# Patient Record
Sex: FEMALE
[De-identification: names, ages, dates, MRNs, and addresses within clinical notes are randomized per-mention and may not be internally consistent; named-entity substitution may affect disease eponyms.]

---

## 2018-08-24 PROBLEM — Z00.00 ENCOUNTER FOR PREVENTIVE HEALTH EXAMINATION: Status: ACTIVE | Noted: 2018-08-24

## 2018-10-03 PROBLEM — E04.1 SOLITARY THYROID NODULE: Status: RESOLVED | Noted: 2018-10-03 | Resolved: 2018-10-03

## 2018-10-03 PROBLEM — Z78.9 CAFFEINE USE: Status: ACTIVE | Noted: 2018-10-03

## 2018-10-03 PROBLEM — Z87.898 HISTORY OF BREAST LUMP: Status: RESOLVED | Noted: 2018-10-03 | Resolved: 2018-10-03

## 2018-10-03 PROBLEM — Z87.39 HISTORY OF MYOPATHY: Status: RESOLVED | Noted: 2018-10-03 | Resolved: 2018-10-03

## 2018-10-08 ENCOUNTER — NON-APPOINTMENT (OUTPATIENT)
Age: 43
End: 2018-10-08

## 2018-10-08 ENCOUNTER — APPOINTMENT (OUTPATIENT)
Dept: CARDIOLOGY | Facility: CLINIC | Age: 43
End: 2018-10-08
Payer: COMMERCIAL

## 2018-10-08 VITALS
HEART RATE: 66 BPM | SYSTOLIC BLOOD PRESSURE: 120 MMHG | DIASTOLIC BLOOD PRESSURE: 83 MMHG | HEIGHT: 62 IN | OXYGEN SATURATION: 100 %

## 2018-10-08 DIAGNOSIS — Z78.9 OTHER SPECIFIED HEALTH STATUS: ICD-10-CM

## 2018-10-08 DIAGNOSIS — Z87.898 PERSONAL HISTORY OF OTHER SPECIFIED CONDITIONS: ICD-10-CM

## 2018-10-08 DIAGNOSIS — R42 DIZZINESS AND GIDDINESS: ICD-10-CM

## 2018-10-08 DIAGNOSIS — Z87.39 PERSONAL HISTORY OF OTHER DISEASES OF THE MUSCULOSKELETAL SYSTEM AND CONNECTIVE TISSUE: ICD-10-CM

## 2018-10-08 DIAGNOSIS — R00.0 TACHYCARDIA, UNSPECIFIED: ICD-10-CM

## 2018-10-08 DIAGNOSIS — R00.2 PALPITATIONS: ICD-10-CM

## 2018-10-08 DIAGNOSIS — E04.1 NONTOXIC SINGLE THYROID NODULE: ICD-10-CM

## 2018-10-08 PROCEDURE — 93000 ELECTROCARDIOGRAM COMPLETE: CPT

## 2018-10-08 PROCEDURE — 99214 OFFICE O/P EST MOD 30 MIN: CPT

## 2018-10-08 RX ORDER — OMEPRAZOLE 40 MG/1
40 CAPSULE, DELAYED RELEASE ORAL
Refills: 0 | Status: ACTIVE | COMMUNITY

## 2018-10-08 RX ORDER — PROPRANOLOL HYDROCHLORIDE 60 MG/1
60 TABLET ORAL
Qty: 180 | Refills: 3 | Status: DISCONTINUED | COMMUNITY
End: 2018-10-08

## 2018-10-13 PROBLEM — R00.0 TACHYCARDIA: Status: ACTIVE | Noted: 2018-10-03

## 2019-06-13 ENCOUNTER — MEDICATION RENEWAL (OUTPATIENT)
Age: 44
End: 2019-06-13

## 2019-12-12 ENCOUNTER — RX RENEWAL (OUTPATIENT)
Age: 44
End: 2019-12-12

## 2019-12-23 ENCOUNTER — NON-APPOINTMENT (OUTPATIENT)
Age: 44
End: 2019-12-23

## 2019-12-23 ENCOUNTER — APPOINTMENT (OUTPATIENT)
Dept: ELECTROPHYSIOLOGY | Facility: CLINIC | Age: 44
End: 2019-12-23
Payer: COMMERCIAL

## 2019-12-23 VITALS
HEART RATE: 78 BPM | DIASTOLIC BLOOD PRESSURE: 83 MMHG | SYSTOLIC BLOOD PRESSURE: 122 MMHG | WEIGHT: 117 LBS | OXYGEN SATURATION: 100 % | BODY MASS INDEX: 21.53 KG/M2 | HEIGHT: 62 IN

## 2019-12-23 DIAGNOSIS — I95.1 TACHYCARDIA, UNSPECIFIED: ICD-10-CM

## 2019-12-23 DIAGNOSIS — R00.0 TACHYCARDIA, UNSPECIFIED: ICD-10-CM

## 2019-12-23 PROCEDURE — 99214 OFFICE O/P EST MOD 30 MIN: CPT

## 2019-12-23 PROCEDURE — 93000 ELECTROCARDIOGRAM COMPLETE: CPT

## 2019-12-23 RX ORDER — FOLIC ACID 1 MG/1
1 TABLET ORAL
Refills: 0 | Status: DISCONTINUED | COMMUNITY
End: 2019-12-23

## 2019-12-24 NOTE — HISTORY OF PRESENT ILLNESS
[FreeTextEntry1] : I had the pleasure of seeing your Patient Damari Hudson today in arrhythmia clinic of [facility]. As you well know the patient is delightful 42-year-old woman with a complex past medical history. I first saw her back in 2016. She complained of recurrent palpitations, shortness of breath and lightheadedness. The patient had been completely healthy until she was evaluated for breast lump. I in 2008 including gadolinium contrast She had allergic reaction including anaphylaxis. Within the scanner itself she had dramatic palpitations.The patient was treated with prednisone and had recurrent rashes and shortness of breath as well as her urine turning red. She was on steroids for a very long time and ultimately developed a steriod induced myopathy. this was quite disabling. She reports being bedridden for up to 6 months and had to use a walker for over 2 years. Subsequent to that the patient developed symptoms of tachycardia, lightheadedness and exercise intolerance. She received the diagnosis of POTS. She was initially treated with atenolol and midodrine without effect. She did somewhat better over time until June of 2015.\par \par She noticed her heart rate jumping up with minimal exercise. On a treadmill for heart rate went to 186 beats per minute rapidly. Her episodes increased in frequency and was just getting up alone her heart rate would go up to 150 beats per minute. In addition with these episodes she complained of shortness of breath, chest pressure and facial flushing. At times she had abdominal symptoms as well. She had been seen back in the [facility] in 2011 for GI symptoms and diastolic hypertension. She was told she had some form of diastolic dysfunction. A cardiac workup included a normal echocardiogram. To monitor in 2010 was normal as well. A more recent event recorder back in 2016 showed sinus tachycardia with rates 280 beats per minute. She was ruled out for pheochromocytoma and carcinoid syndrome. Given her diagnosis of POTS we discussed the potential treatment options. She had failed midodrine and atenolol in the past. We tried Paxil without effect. Given the complexity of her disorder I referred her to Dr. Chu Fernandes in [facility] one of the world experts on POTS.  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 For her symptoms he prescribed aerobic reconditioning and initially tried Mestinon and Ivabridine without effect. She went back on long-acting propranolol which she self manages the dose to some degree. She returns to clinic today for follow-up.\par \par Overall the patient has been doing fairly well. She still has issues with elevated heart rates at times and other symptomatology but is much better than when I first met her. She remains on long-acting propranolol 60 mg a day and then she takes either 10 or 20 mg on days when her heart rate jumps up. She is trying to get pregnant and a couple of weeks ago was given Clomid. There does not seem to be a relationship between her symptoms and for hormone status. She saw an endocrinologist who did an extensive workup. She has a history of a left-sided thyroid nodule which has been biopsied multiple times and is benign. There is some suggestion of a possible underlying autoimmune thyroiditis and a question of hyperprolactinemia in the past. On repeat testing her thyroid function tests were completely normal, she did however have an elevated anti-thyroglobulin antibody and she was thought to have an underlying autoimmune thyroiditis with normal thyroid function. Her pituitary function was normal.\par \par Today in clinic her blood pressure was 122/83 her pulse was 78. Her EKG revealed sinus rhythm at 76 beats and was normal. The remainder of her exam was unremarkable.\par \par

## 2019-12-24 NOTE — DISCUSSION/SUMMARY
[FreeTextEntry1] : In summary the patient is a delightful 44-year-old woman with gadolinium toxicity and [place]. She is doing better on her current regimen of propranolol. We discussed that these is a difficult condition to treat and that ultimately is something that she is going to have to live with as opposed to cure. The patient is accepting of this as she is much more functional and much less limited than she was when we first met. She will continue on her current regimen and we will be seeing her back in followup.

## 2021-01-01 ENCOUNTER — RX RENEWAL (OUTPATIENT)
Age: 46
End: 2021-01-01

## 2021-01-14 ENCOUNTER — APPOINTMENT (OUTPATIENT)
Dept: ELECTROPHYSIOLOGY | Facility: CLINIC | Age: 46
End: 2021-01-14
Payer: COMMERCIAL

## 2021-01-14 PROCEDURE — 99214 OFFICE O/P EST MOD 30 MIN: CPT | Mod: 95

## 2021-01-14 NOTE — REASON FOR VISIT
[Home] : at home, [unfilled] , at the time of the visit. [Medical Office: ([facility])___] : at the medical office located in  [Verbal consent obtained from patient] : the patient, [unfilled] [Follow-Up - Clinic] : a clinic follow-up of [FreeTextEntry1] : POTS [Spouse] : spouse

## 2021-01-14 NOTE — HISTORY OF PRESENT ILLNESS
[FreeTextEntry1] : I had the pleasure of seeing your Patient Damari Hudson today via telehealth in the arrhythmia clinic of [facility]. As you well know the patient is delightful 45-year-old woman with a complex past medical history. I first saw her back in 2016. She complained of recurrent palpitations, shortness of breath and lightheadedness. The patient had been completely healthy until she was evaluated for breast lump in 2008 with an MRI including gadolinium contrast. She had allergic reaction including anaphylaxis. Within the scanner itself she had dramatic palpitations.The patient was treated with prednisone and had recurrent rashes and shortness of breath as well as her urine turning red. She was on steroids for a very long time and ultimately developed a steriod induced myopathy. She reports being bedridden for up to 6 months and had to use a walker for over 2 years. Subsequent to that the patient developed symptoms of tachycardia, lightheadedness and exercise intolerance. She received the diagnosis of POTS. She was initially treated with atenolol and midodrine without effect. She did somewhat better over time until June of 2015.\par \par She noticed her heart rate jumping up with minimal exercise. On a treadmill for heart rate went to 186 beats per minute rapidly. Her episodes increased in frequency and was just getting up her heart rate would go to 150 beats per minute. In addition with these episodes she complained of shortness of breath, chest pressure and facial flushing. At times she had abdominal symptoms as well. She had been seen back in the [facility] in 2011 for GI symptoms and diastolic hypertension. She was told she had some form of diastolic dysfunction. A cardiac workup included a normal echocardiogram. To monitor in 2010 was normal as well. A more recent event recorder back in 2016 showed sinus tachycardia with rates up to 180 beats per minute. She was ruled out for pheochromocytoma and carcinoid syndrome. She was diagnosed with POTS. She had failed midodrine and atenolol in the past. We tried Paxil without effect. Given the complexity of her disorder I referred her to Dr. Chu Fernandes in [facility] one of the world experts on POTS.  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 For her symptoms he prescribed aerobic reconditioning and initially tried Mestinon and Ivabridine without effect. She went back on long-acting propranolol which she self manages. The patient has undergone biopsy for a thyroid nodule and is being followed for possible Hashimoto's (he TSH has been normal. In addition she had been undergoing infertility treatment. She returns to clinic today for follow-up.\par \par Overall the patient has been doing fairly well. She states that things have "stabilized."  She continues to use the long acting 60mg and short acting 20mg of Propranolol as needed. For the most part she is having episodes 2-3x/week. However instead of the 140 BPM she had in the past, it has been going to 110-120.   She did have a period of worsening tachycardia in response to a different hormone treatment which was discontinued. She increased the propranolol, taking the 60mg every day and this has resolved. \par \par \par \par

## 2021-01-14 NOTE — DISCUSSION/SUMMARY
[FreeTextEntry1] : In summary the patient is a delightful 45-year-old woman with gadolinium toxicity and POTS. She is doing better on her current regimen of propranolol. We discussed that these is a difficult condition to treat and that ultimately is something that she is going to have to live with as opposed to cure. The patient is accepting of this as she is much more functional and much less limited than she was when we first met. She will continue on her current regimen and we will be seeing her back in followup.

## 2021-07-21 ENCOUNTER — RX RENEWAL (OUTPATIENT)
Age: 46
End: 2021-07-21

## 2021-09-27 ENCOUNTER — RX RENEWAL (OUTPATIENT)
Age: 46
End: 2021-09-27

## 2021-09-27 RX ORDER — PROPRANOLOL HYDROCHLORIDE 10 MG/1
10 TABLET ORAL
Qty: 90 | Refills: 1 | Status: ACTIVE | COMMUNITY
Start: 2019-12-23 | End: 1900-01-01

## 2021-11-22 ENCOUNTER — RX RENEWAL (OUTPATIENT)
Age: 46
End: 2021-11-22

## 2023-09-08 ENCOUNTER — APPOINTMENT (OUTPATIENT)
Dept: ELECTROPHYSIOLOGY | Facility: CLINIC | Age: 48
End: 2023-09-08
Payer: COMMERCIAL

## 2023-09-08 PROCEDURE — 99214 OFFICE O/P EST MOD 30 MIN: CPT | Mod: 95

## 2023-09-08 RX ORDER — PROPRANOLOL HYDROCHLORIDE 60 MG/1
60 CAPSULE, EXTENDED RELEASE ORAL
Qty: 90 | Refills: 3 | Status: ACTIVE | COMMUNITY
Start: 1900-01-01 | End: 1900-01-01

## 2023-09-08 RX ORDER — PROPRANOLOL HYDROCHLORIDE 20 MG/1
20 TABLET ORAL
Qty: 30 | Refills: 8 | Status: ACTIVE | COMMUNITY
Start: 2018-10-08 | End: 1900-01-01

## 2023-09-08 NOTE — REASON FOR VISIT
[Home] : at home, [unfilled] , at the time of the visit. [Medical Office: ([facility])___] : at the medical office located in  [Spouse] : spouse [Verbal consent obtained from patient] : the patient, [unfilled]

## 2023-09-08 NOTE — DISCUSSION/SUMMARY
[FreeTextEntry1] : In summary the patient is a delightful 48-year-old woman with gadolinium toxicity and POTS. She had been doing well on a maintenance dose of 60 mg somewhat acting propranolol but recently has been less well controlled.  It is unclear what has prompted this change in symptomatology although things have waxed and waned over the years in the past.  I am wondering if her hormonal issues are not contributing to this.  She did ask if she can start niacin which she was prescribed but I am concerned that given her current symptomatology it may worsen her flushing and palpitations and perhaps a different choice of lipid-lowering drugs may be better.  For now we are going to try a slightly increased dose of propranolol.  She is going to take the short acting dose, 20 mg at lunchtime to see if this helps in addition to her daily dose.  In the past she was on 80 mg of the sustained-release and we may have to go back to this.  She will be back in touch with me in a week or so to let me know how she is doing.

## 2024-11-11 ENCOUNTER — RX RENEWAL (OUTPATIENT)
Age: 49
End: 2024-11-11

## 2024-12-11 ENCOUNTER — APPOINTMENT (OUTPATIENT)
Dept: ELECTROPHYSIOLOGY | Facility: CLINIC | Age: 49
End: 2024-12-11
Payer: COMMERCIAL

## 2024-12-11 DIAGNOSIS — R00.2 PALPITATIONS: ICD-10-CM

## 2024-12-11 DIAGNOSIS — G90.A POSTURAL ORTHOSTATIC TACHYCARDIA SYNDROME [POTS]: ICD-10-CM

## 2024-12-11 PROCEDURE — 99214 OFFICE O/P EST MOD 30 MIN: CPT

## 2025-09-15 ENCOUNTER — RX RENEWAL (OUTPATIENT)
Age: 50
End: 2025-09-15